# Patient Record
Sex: FEMALE | Race: BLACK OR AFRICAN AMERICAN | NOT HISPANIC OR LATINO | Employment: FULL TIME | URBAN - METROPOLITAN AREA
[De-identification: names, ages, dates, MRNs, and addresses within clinical notes are randomized per-mention and may not be internally consistent; named-entity substitution may affect disease eponyms.]

---

## 2023-03-14 ENCOUNTER — APPOINTMENT (OUTPATIENT)
Dept: PRIMARY CARE | Facility: CLINIC | Age: 43
End: 2023-03-14
Payer: COMMERCIAL

## 2023-04-04 RX ORDER — SERTRALINE HYDROCHLORIDE 25 MG/1
25 TABLET, FILM COATED ORAL DAILY
OUTPATIENT
Start: 2023-04-04

## 2023-04-04 RX ORDER — SERTRALINE HYDROCHLORIDE 25 MG/1
25 TABLET, FILM COATED ORAL DAILY
COMMUNITY
End: 2023-04-05 | Stop reason: SDUPTHER

## 2023-04-04 RX ORDER — TRIAMTERENE/HYDROCHLOROTHIAZID 37.5-25 MG
1 TABLET ORAL DAILY
OUTPATIENT
Start: 2023-04-04

## 2023-04-04 RX ORDER — TRIAMTERENE/HYDROCHLOROTHIAZID 37.5-25 MG
1 TABLET ORAL DAILY
COMMUNITY
End: 2023-04-05 | Stop reason: SDUPTHER

## 2023-04-05 DIAGNOSIS — I10 ESSENTIAL (PRIMARY) HYPERTENSION: Primary | ICD-10-CM

## 2023-04-05 DIAGNOSIS — F41.9 ANXIETY: ICD-10-CM

## 2023-04-05 RX ORDER — SERTRALINE HYDROCHLORIDE 25 MG/1
25 TABLET, FILM COATED ORAL DAILY
Qty: 30 TABLET | Refills: 0 | Status: SHIPPED | OUTPATIENT
Start: 2023-04-05 | End: 2023-04-26 | Stop reason: SDUPTHER

## 2023-04-05 RX ORDER — TRIAMTERENE/HYDROCHLOROTHIAZID 37.5-25 MG
1 TABLET ORAL DAILY
Qty: 30 TABLET | Refills: 0 | Status: SHIPPED | OUTPATIENT
Start: 2023-04-05 | End: 2023-04-26 | Stop reason: SDUPTHER

## 2023-04-26 DIAGNOSIS — I10 ESSENTIAL (PRIMARY) HYPERTENSION: ICD-10-CM

## 2023-04-26 DIAGNOSIS — F41.9 ANXIETY: ICD-10-CM

## 2023-04-26 RX ORDER — SERTRALINE HYDROCHLORIDE 25 MG/1
25 TABLET, FILM COATED ORAL DAILY
Qty: 30 TABLET | Refills: 0 | Status: SHIPPED | OUTPATIENT
Start: 2023-04-26 | End: 2023-05-26 | Stop reason: SDUPTHER

## 2023-04-26 RX ORDER — TRIAMTERENE/HYDROCHLOROTHIAZID 37.5-25 MG
1 TABLET ORAL DAILY
Qty: 30 TABLET | Refills: 0 | Status: SHIPPED | OUTPATIENT
Start: 2023-04-26 | End: 2023-05-01 | Stop reason: SDUPTHER

## 2023-05-01 ENCOUNTER — OFFICE VISIT (OUTPATIENT)
Dept: PRIMARY CARE | Facility: CLINIC | Age: 43
End: 2023-05-01
Payer: COMMERCIAL

## 2023-05-01 VITALS
HEIGHT: 68 IN | TEMPERATURE: 97.5 F | HEART RATE: 97 BPM | SYSTOLIC BLOOD PRESSURE: 136 MMHG | OXYGEN SATURATION: 98 % | WEIGHT: 293 LBS | BODY MASS INDEX: 44.41 KG/M2 | DIASTOLIC BLOOD PRESSURE: 86 MMHG

## 2023-05-01 DIAGNOSIS — I10 ESSENTIAL (PRIMARY) HYPERTENSION: Primary | ICD-10-CM

## 2023-05-01 DIAGNOSIS — F41.9 ANXIETY: ICD-10-CM

## 2023-05-01 PROCEDURE — 3075F SYST BP GE 130 - 139MM HG: CPT | Performed by: FAMILY MEDICINE

## 2023-05-01 PROCEDURE — 99214 OFFICE O/P EST MOD 30 MIN: CPT | Performed by: FAMILY MEDICINE

## 2023-05-01 PROCEDURE — 3079F DIAST BP 80-89 MM HG: CPT | Performed by: FAMILY MEDICINE

## 2023-05-01 PROCEDURE — 1036F TOBACCO NON-USER: CPT | Performed by: FAMILY MEDICINE

## 2023-05-01 RX ORDER — TRIAMTERENE/HYDROCHLOROTHIAZID 37.5-25 MG
1 TABLET ORAL DAILY
Qty: 90 TABLET | Refills: 0 | Status: SHIPPED | OUTPATIENT
Start: 2023-05-01 | End: 2023-08-24 | Stop reason: SDUPTHER

## 2023-05-01 ASSESSMENT — PATIENT HEALTH QUESTIONNAIRE - PHQ9
SUM OF ALL RESPONSES TO PHQ9 QUESTIONS 1 AND 2: 0
2. FEELING DOWN, DEPRESSED OR HOPELESS: NOT AT ALL
1. LITTLE INTEREST OR PLEASURE IN DOING THINGS: NOT AT ALL

## 2023-05-01 ASSESSMENT — PAIN SCALES - GENERAL: PAINLEVEL: 0-NO PAIN

## 2023-05-01 NOTE — PATIENT INSTRUCTIONS
BP with fair control on current medication.  No change today.  Continue current medicines.                                                                                                Blood Pressure Treatment goals include:                                                                                    BP of 120/80, with no higher than 140/85 on consistent basis.                               Exercise at 150 minutes weekly.  Eat a balanced diet, rich in fruits and vegetables, low in sodium and processed foods.  If you are overweight, work toward a goal BMI of less than 25.  Check fasting blood work some morning prior to eating.    Anxiety is controlled on sertraline 25 mg, but concern about whether it may be related to weight gain.  Can wean down on dose to 1/2 pill for 1-2 weeks, then 1/2 pill every other day for 1-2 weeks, then stop.  If anxiety starts to creep back in, will start duloxetine 20 mg, which does not have weight can associated with it.    Can monitor knee for know.  If continued or worsening, will refer to ortho.

## 2023-05-01 NOTE — PROGRESS NOTES
"Subjective   Patient ID: Nico Pedro is a 43 y.o. female who presents for Med Management (Pt. Presents for medication management).  Had procedure with Dr. Schreiber  Still having heavy periods, but not as bad with cramping.  May end up with JANAY.    Is at heaviest, and is working on weight loss.  Thinking of weaning off.  Anxiety is controlled.  Stress is not bad now.    Not checking BP.  No SE to medication.  No CP, SOB, palpitation, or swelling.  No HA or dizziness.    Noted right knee swelling and popping, no trauma.  Still some discomfort.  Doing RICE.          Review of Systems    Objective   /86 (BP Location: Right arm, Patient Position: Sitting, BP Cuff Size: Large adult)   Pulse 97   Temp 36.4 °C (97.5 °F) (Temporal)   Ht 1.727 m (5' 8\")   Wt 139 kg (307 lb 6.4 oz)   SpO2 98%   BMI 46.74 kg/m²     Physical Exam  Constitutional:       Appearance: Normal appearance.   Cardiovascular:      Rate and Rhythm: Normal rate and regular rhythm.      Heart sounds: No murmur heard.  Pulmonary:      Effort: Pulmonary effort is normal.      Breath sounds: Normal breath sounds.   Musculoskeletal:      Right lower leg: No edema.      Left lower leg: No edema.   Neurological:      Mental Status: She is alert.           Assessment/Plan   Problem List Items Addressed This Visit          Circulatory    Essential (primary) hypertension - Primary    Relevant Medications    triamterene-hydrochlorothiazid (Maxzide-25) 37.5-25 mg tablet    Other Relevant Orders    Basic Metabolic Panel    Lipid Panel       Other    Anxiety          "

## 2023-05-26 DIAGNOSIS — F41.9 ANXIETY: ICD-10-CM

## 2023-05-26 RX ORDER — SERTRALINE HYDROCHLORIDE 25 MG/1
25 TABLET, FILM COATED ORAL DAILY
Qty: 30 TABLET | Refills: 0 | Status: SHIPPED | OUTPATIENT
Start: 2023-05-26 | End: 2023-06-26 | Stop reason: SDUPTHER

## 2023-06-07 ENCOUNTER — LAB (OUTPATIENT)
Dept: LAB | Facility: LAB | Age: 43
End: 2023-06-07
Payer: COMMERCIAL

## 2023-06-07 DIAGNOSIS — I10 ESSENTIAL (PRIMARY) HYPERTENSION: ICD-10-CM

## 2023-06-07 LAB
ANION GAP IN SER/PLAS: 10 MMOL/L (ref 10–20)
CALCIUM (MG/DL) IN SER/PLAS: 9.3 MG/DL (ref 8.6–10.6)
CARBON DIOXIDE, TOTAL (MMOL/L) IN SER/PLAS: 29 MMOL/L (ref 21–32)
CHLORIDE (MMOL/L) IN SER/PLAS: 105 MMOL/L (ref 98–107)
CHOLESTEROL (MG/DL) IN SER/PLAS: 164 MG/DL (ref 0–199)
CHOLESTEROL IN HDL (MG/DL) IN SER/PLAS: 37.9 MG/DL
CHOLESTEROL/HDL RATIO: 4.3
CREATININE (MG/DL) IN SER/PLAS: 0.59 MG/DL (ref 0.5–1.05)
GFR FEMALE: >90 ML/MIN/1.73M2
GLUCOSE (MG/DL) IN SER/PLAS: 98 MG/DL (ref 74–99)
LDL: 112 MG/DL (ref 0–99)
POTASSIUM (MMOL/L) IN SER/PLAS: 4 MMOL/L (ref 3.5–5.3)
SODIUM (MMOL/L) IN SER/PLAS: 140 MMOL/L (ref 136–145)
TRIGLYCERIDE (MG/DL) IN SER/PLAS: 69 MG/DL (ref 0–149)
UREA NITROGEN (MG/DL) IN SER/PLAS: 10 MG/DL (ref 6–23)
VLDL: 14 MG/DL (ref 0–40)

## 2023-06-07 PROCEDURE — 36415 COLL VENOUS BLD VENIPUNCTURE: CPT

## 2023-06-07 PROCEDURE — 80048 BASIC METABOLIC PNL TOTAL CA: CPT

## 2023-06-07 PROCEDURE — 80061 LIPID PANEL: CPT

## 2023-06-26 DIAGNOSIS — F41.9 ANXIETY: ICD-10-CM

## 2023-06-26 RX ORDER — SERTRALINE HYDROCHLORIDE 25 MG/1
25 TABLET, FILM COATED ORAL DAILY
Qty: 30 TABLET | Refills: 0 | Status: SHIPPED | OUTPATIENT
Start: 2023-06-26 | End: 2023-07-25 | Stop reason: SDUPTHER

## 2023-07-25 DIAGNOSIS — F41.9 ANXIETY: ICD-10-CM

## 2023-07-25 RX ORDER — SERTRALINE HYDROCHLORIDE 25 MG/1
25 TABLET, FILM COATED ORAL DAILY
Qty: 90 TABLET | Refills: 0 | Status: SHIPPED | OUTPATIENT
Start: 2023-07-25 | End: 2023-10-23 | Stop reason: SDUPTHER

## 2023-08-24 DIAGNOSIS — I10 ESSENTIAL (PRIMARY) HYPERTENSION: ICD-10-CM

## 2023-08-24 RX ORDER — TRIAMTERENE/HYDROCHLOROTHIAZID 37.5-25 MG
1 TABLET ORAL DAILY
Qty: 90 TABLET | Refills: 0 | Status: SHIPPED | OUTPATIENT
Start: 2023-08-24 | End: 2023-11-22 | Stop reason: SDUPTHER

## 2023-09-13 ENCOUNTER — APPOINTMENT (OUTPATIENT)
Dept: PRIMARY CARE | Facility: CLINIC | Age: 43
End: 2023-09-13
Payer: COMMERCIAL

## 2023-10-23 DIAGNOSIS — F41.9 ANXIETY: ICD-10-CM

## 2023-10-23 RX ORDER — SERTRALINE HYDROCHLORIDE 25 MG/1
25 TABLET, FILM COATED ORAL DAILY
Qty: 90 TABLET | Refills: 0 | Status: SHIPPED | OUTPATIENT
Start: 2023-10-23 | End: 2024-01-23 | Stop reason: SDUPTHER

## 2023-10-25 ENCOUNTER — APPOINTMENT (OUTPATIENT)
Dept: PRIMARY CARE | Facility: CLINIC | Age: 43
End: 2023-10-25
Payer: COMMERCIAL

## 2023-11-22 DIAGNOSIS — I10 ESSENTIAL (PRIMARY) HYPERTENSION: ICD-10-CM

## 2023-11-23 RX ORDER — TRIAMTERENE/HYDROCHLOROTHIAZID 37.5-25 MG
1 TABLET ORAL DAILY
Qty: 90 TABLET | Refills: 0 | Status: SHIPPED | OUTPATIENT
Start: 2023-11-23 | End: 2024-02-20 | Stop reason: SDUPTHER

## 2023-12-12 ENCOUNTER — APPOINTMENT (OUTPATIENT)
Dept: PRIMARY CARE | Facility: CLINIC | Age: 43
End: 2023-12-12
Payer: COMMERCIAL

## 2024-01-23 DIAGNOSIS — F41.9 ANXIETY: ICD-10-CM

## 2024-01-23 RX ORDER — SERTRALINE HYDROCHLORIDE 25 MG/1
25 TABLET, FILM COATED ORAL DAILY
Qty: 90 TABLET | Refills: 0 | Status: SHIPPED | OUTPATIENT
Start: 2024-01-23 | End: 2024-04-22 | Stop reason: SDUPTHER

## 2024-02-20 DIAGNOSIS — I10 ESSENTIAL (PRIMARY) HYPERTENSION: ICD-10-CM

## 2024-02-20 RX ORDER — TRIAMTERENE/HYDROCHLOROTHIAZID 37.5-25 MG
1 TABLET ORAL DAILY
Qty: 90 TABLET | Refills: 0 | Status: SHIPPED | OUTPATIENT
Start: 2024-02-20 | End: 2024-05-19

## 2024-03-04 ENCOUNTER — OFFICE VISIT (OUTPATIENT)
Dept: PRIMARY CARE | Facility: CLINIC | Age: 44
End: 2024-03-04
Payer: COMMERCIAL

## 2024-03-04 VITALS
HEART RATE: 103 BPM | BODY MASS INDEX: 44.41 KG/M2 | SYSTOLIC BLOOD PRESSURE: 142 MMHG | HEIGHT: 68 IN | WEIGHT: 293 LBS | TEMPERATURE: 97.7 F | OXYGEN SATURATION: 97 % | DIASTOLIC BLOOD PRESSURE: 62 MMHG

## 2024-03-04 DIAGNOSIS — J45.40 MODERATE PERSISTENT ASTHMA WITHOUT COMPLICATION (HHS-HCC): ICD-10-CM

## 2024-03-04 DIAGNOSIS — F41.9 ANXIETY: ICD-10-CM

## 2024-03-04 DIAGNOSIS — I10 ESSENTIAL (PRIMARY) HYPERTENSION: ICD-10-CM

## 2024-03-04 DIAGNOSIS — Z12.31 BREAST CANCER SCREENING BY MAMMOGRAM: ICD-10-CM

## 2024-03-04 DIAGNOSIS — E66.01 MORBID OBESITY WITH BODY MASS INDEX (BMI) OF 45.0 TO 49.9 IN ADULT (MULTI): ICD-10-CM

## 2024-03-04 DIAGNOSIS — Z12.11 COLON CANCER SCREENING: ICD-10-CM

## 2024-03-04 DIAGNOSIS — G47.33 OSA (OBSTRUCTIVE SLEEP APNEA): ICD-10-CM

## 2024-03-04 DIAGNOSIS — Z00.00 ROUTINE GENERAL MEDICAL EXAMINATION AT A HEALTH CARE FACILITY: Primary | ICD-10-CM

## 2024-03-04 PROCEDURE — 1036F TOBACCO NON-USER: CPT | Performed by: FAMILY MEDICINE

## 2024-03-04 PROCEDURE — 99213 OFFICE O/P EST LOW 20 MIN: CPT | Performed by: FAMILY MEDICINE

## 2024-03-04 PROCEDURE — 3077F SYST BP >= 140 MM HG: CPT | Performed by: FAMILY MEDICINE

## 2024-03-04 PROCEDURE — 3078F DIAST BP <80 MM HG: CPT | Performed by: FAMILY MEDICINE

## 2024-03-04 PROCEDURE — 99396 PREV VISIT EST AGE 40-64: CPT | Performed by: FAMILY MEDICINE

## 2024-03-04 PROCEDURE — 3008F BODY MASS INDEX DOCD: CPT | Performed by: FAMILY MEDICINE

## 2024-03-04 RX ORDER — ALBUTEROL SULFATE 90 UG/1
2 AEROSOL, METERED RESPIRATORY (INHALATION) EVERY 4 HOURS PRN
COMMUNITY
Start: 2022-07-13

## 2024-03-04 ASSESSMENT — ENCOUNTER SYMPTOMS
OCCASIONAL FEELINGS OF UNSTEADINESS: 0
DEPRESSION: 0
LOSS OF SENSATION IN FEET: 0

## 2024-03-04 ASSESSMENT — PAIN SCALES - GENERAL: PAINLEVEL: 0-NO PAIN

## 2024-03-04 ASSESSMENT — PATIENT HEALTH QUESTIONNAIRE - PHQ9
2. FEELING DOWN, DEPRESSED OR HOPELESS: NOT AT ALL
SUM OF ALL RESPONSES TO PHQ9 QUESTIONS 1 AND 2: 0
1. LITTLE INTEREST OR PLEASURE IN DOING THINGS: NOT AT ALL

## 2024-03-04 NOTE — PROGRESS NOTES
"Subjective   Patient ID: Nico Pedro is a 44 y.o. female who presents for Annual Exam, Asthma, and Obesity.  Well Adult Physical   Patient here for a comprehensive physical exam.     History:  LMP: beginning feb 2024  Last pap date: 2020?  Abnormal pap? no  Para: 2    No colon cancer in family.    Tried CPAP, but wasn't tolerating,  so sent it back.  Knows she needs to get back on it, so needs new order for it.    Is  at  highest weight.  Not losing anything.  Exercises, tries to eat well.  Had tried nutritionist and  weight watches.    Has been out of Qvar and is wheezing more.    Had been anemic  in the past, when periods were heavier.   Wonders if  she should be taking iron still.    Anxiety is stable on 25 mg sertraline.          Review of Systems   All other systems reviewed and are negative.      Objective   /62 (BP Location: Right arm, Patient Position: Sitting, BP Cuff Size: Adult)   Pulse 103   Temp 36.5 °C (97.7 °F) (Oral)   Ht 1.727 m (5' 8\")   Wt 145 kg (320 lb 3.2 oz)   SpO2 97%   BMI 48.69 kg/m²     Physical Exam  Vitals reviewed.   Constitutional:       General: She is not in acute distress.     Appearance: Normal appearance. She is obese.   HENT:      Head: Normocephalic and atraumatic.   Eyes:      Pupils: Pupils are equal, round, and reactive to light.   Cardiovascular:      Rate and Rhythm: Normal rate and regular rhythm.      Heart sounds: Normal heart sounds. No murmur heard.  Pulmonary:      Effort: Pulmonary effort is normal.      Breath sounds: Normal breath sounds.   Abdominal:      General: Bowel sounds are normal.      Palpations: Abdomen is soft. There is no mass.      Tenderness: There is no abdominal tenderness.   Musculoskeletal:      Cervical back: No rigidity.   Lymphadenopathy:      Cervical: No cervical adenopathy.   Skin:     General: Skin is warm and dry.   Neurological:      Mental Status: She is alert. Mental status is at baseline.      Gait: Gait normal. "   Psychiatric:         Mood and Affect: Mood normal.         Behavior: Behavior normal.         Thought Content: Thought content normal.         Judgment: Judgment normal.           Assessment/Plan   Problem List Items Addressed This Visit       Anxiety    Essential (primary) hypertension    Relevant Orders    Follow Up In [facility]    Moderate persistent asthma without complication    Relevant Medications    beclomethasone dipropionate (Qvar) 80 mcg/actuation inhaler     Other Visit Diagnoses       Routine general medical examination at a health care facility    -  Primary    Relevant Orders    Lipid Panel    Hemoglobin A1C    Comprehensive Metabolic Panel    CBC    Iron and TIBC    Breast cancer screening by mammogram        Relevant Orders    BI mammo bilateral screening tomosynthesis    Colon cancer screening        Relevant Orders    Cologuard® colon cancer screening    CISCO (obstructive sleep apnea)        Relevant Orders    Positive Airway Pressure (PAP) Therapy    Follow Up In [facility] Pharm[facility]    Morbid obesity with body mass index (BMI) of 45.0 to 49.9 in adult (CMS/[facility])        Relevant Orders    Follow Up In [facility] Pharmacy

## 2024-03-04 NOTE — PATIENT INSTRUCTIONS
Immunizations recommended:  --Flu shot every fall. -done  --Shingrix is the shingles vaccine, and can be done after the age of 50.  --Tetanus every 10 years. Yours was done in 2018.  --Covid vaccine.    If your pap smears have been normal, you can do them every 3-5 years, and stop after the age of 65.  Yours was done in 2020, and should be done next year.    Colonoscopy should be done every 10 years after the age of 45, unless there was a previous abnormality, or family history of colon cancer.  Alternatives would be Cologuard every 3 years (looks for genetic evidence of colon cancer in stool), or stool FIT stool test yearly (looks for microscopic blood in stool)  --cologuard ordered.    Mammogram screening recommendations are changing, but at this time, I recommend a mammogram every 1-2 years in your 40's, and yearly after the age of 50.  Having a family history of breast cancer may change that.  --ordered    You should try to get 150 minutes of exercise weekly.  Be sure to eat a diet rich in fruits and vegetables, and low in saturated fats and sodium.  Strive for a healthy BMI of less than 25.     Make sure to wear sun screen when outside, and check for changing or unusual moles.    Pre-menopause recommendations are for 1000 mg calcium and 1000 units vitamin D3 daily.  After menopause calcium recommendation is 1200 mg, with 1000 units of vitamin D3    I recommend you get a Living Will and Durable Power of  for Healthcare. These documents state what care you would want if you couldn't speak for yourself. They help your loved ones in caring for you if that happens.   Once you have those forms completed, you can keep a copy on file with your doctor.    For medication help with weight  loss,  refer to  pharmacy.  Continue to work on diet and exercise.    For asthma, restart Qvar 80 mcg, 2 puffs twice a day.  If continue wheezing, or using albuterol more than 3 times  a week,  will adjust  medication.    BP  borderline.  Continue triamterene hydrochlorothiazide   Work on weight loss, which can help improve BP.  Follow up in 1-3 months.  If BP still borderline  or high, will add another medication.    For CISCO, Auto-PAP ordered.    Check fasting blood work some morning.  Fast for 12 hours prior to having blood drawn.  You should drink plenty of water, and can have black tea or black coffee, if you'd like.    For anxiety,  continue low dose sertraline 25 mg.

## 2024-03-07 ENCOUNTER — TELEPHONE (OUTPATIENT)
Dept: PRIMARY CARE | Facility: CLINIC | Age: 44
End: 2024-03-07
Payer: COMMERCIAL

## 2024-03-07 DIAGNOSIS — G47.33 OSA (OBSTRUCTIVE SLEEP APNEA): Primary | ICD-10-CM

## 2024-03-07 NOTE — TELEPHONE ENCOUNTER
Medical Service company called received order for cpap but can not go through with request, says that patient needs to have an new sleep study old one has .

## 2024-03-13 ENCOUNTER — APPOINTMENT (OUTPATIENT)
Dept: RADIOLOGY | Facility: CLINIC | Age: 44
End: 2024-03-13
Payer: COMMERCIAL

## 2024-03-18 ENCOUNTER — APPOINTMENT (OUTPATIENT)
Dept: PHARMACY | Facility: HOSPITAL | Age: 44
End: 2024-03-18
Payer: COMMERCIAL

## 2024-04-09 ENCOUNTER — TELEMEDICINE (OUTPATIENT)
Dept: PHARMACY | Facility: HOSPITAL | Age: 44
End: 2024-04-09
Payer: COMMERCIAL

## 2024-04-09 DIAGNOSIS — I10 ESSENTIAL (PRIMARY) HYPERTENSION: ICD-10-CM

## 2024-04-09 DIAGNOSIS — G47.33 OSA (OBSTRUCTIVE SLEEP APNEA): ICD-10-CM

## 2024-04-09 DIAGNOSIS — E66.01 MORBID OBESITY WITH BODY MASS INDEX (BMI) OF 45.0 TO 49.9 IN ADULT (MULTI): ICD-10-CM

## 2024-04-09 NOTE — PROGRESS NOTES
Patient ID: Nico Pedro is a 44 y.o. female who presents for Obesity.    Referring Provider: Charlotte Marcos MD  PCP: Charlotte Marcos MD   Last visit with PCP: 3/4/24   Next visit with PCP: None scheduled      Subjective   HPI  PMH includes hypertension, asthma, and obstructive sleep apnea. She exercises regularly and uses [employer]. She has had a visit with a nutritionist before.    Denies personal and family history of medullary thyroid cancer. Denies history of pancreatitis.    Review of Systems      Objective     There were no vitals taken for this visit.   BP Readings from Last 4 Encounters:   03/04/24 142/62   05/01/23 136/86   11/14/22 124/82   06/07/22 132/82      There were no vitals filed for this visit.     Labs  Lab Results   Component Value Date    BILITOT 0.2 03/07/2022    CALCIUM 9.3 06/07/2023    CO2 29 06/07/2023     06/07/2023    CREATININE 0.59 06/07/2023    GLUCOSE 98 06/07/2023    ALKPHOS 62 03/07/2022    K 4.0 06/07/2023    PROT 7.4 03/07/2022     06/07/2023    AST 19 03/07/2022    ALT 19 03/07/2022    BUN 10 06/07/2023    ANIONGAP 10 06/07/2023    PHOS 2.9 03/08/2022    ALBUMIN 3.8 03/08/2022    GFRF >90 06/07/2023     Lab Results   Component Value Date    TRIG 69 06/07/2023    CHOL 164 06/07/2023    HDL 37.9 (A) 06/07/2023     Lab Results   Component Value Date    HGBA1C 5.0 03/07/2022       Current Outpatient Medications   Medication Instructions    albuterol 90 mcg/actuation inhaler 2 puffs, inhalation, Every 4 hours PRN    beclomethasone dipropionate (Qvar) 80 mcg/actuation inhaler 2 Inhalations, inhalation, 2 times daily RT, Rinse mouth with water after use to reduce aftertaste and incidence of candidiasis. Do not swallow.    sertraline (ZOLOFT) 25 mg, oral, Daily, as directed    triamterene-hydrochlorothiazid (Maxzide-25) 37.5-25 mg tablet 1 tablet, oral, Daily         Drug Interactions;  None at time of review    Assessment/Plan   Problem List Items  Addressed This Visit             ICD-10-CM    Essential (primary) hypertension I10     Other Visit Diagnoses         Codes    CISCO (obstructive sleep apnea)     G47.33    Morbid obesity with body mass index (BMI) of 45.0 to 49.9 in adult (CMS/[facility])     E66.01, Z68.42        Unfortunately, current prescription plan does not cover GLP-1 receptor agonists for weight loss and those for diabetes require a type 2 diabetes diagnosis. Paying out of pocket is not feasible, but patient reports her  will have a new job and new plan in the near future. Patient has my contact info if she would like to check coverage.   -Reviewed MOA, potential side effects, and storage of Zepbound/Wegovy.    Follow-up: Patient will call back when new insurance is active     Time spent with pt: Total length of time 10 (minutes) of the encounter and more than 50% was spent counseling the patient.    Bailee Reich, PharmD    Continue all meds under the continuation of care with the referring provider and clinical pharmacy team.    Verbal consent to manage patient's drug therapy was obtained from the patient. They were informed they may decline to participate or withdraw from participation in pharmacy services at any time.

## 2024-04-12 ENCOUNTER — HOSPITAL ENCOUNTER (OUTPATIENT)
Dept: RADIOLOGY | Facility: CLINIC | Age: 44
Discharge: HOME | End: 2024-04-12
Payer: COMMERCIAL

## 2024-04-12 VITALS — BODY MASS INDEX: 44.41 KG/M2 | HEIGHT: 68 IN | WEIGHT: 293 LBS

## 2024-04-12 DIAGNOSIS — Z12.31 BREAST CANCER SCREENING BY MAMMOGRAM: ICD-10-CM

## 2024-04-12 PROCEDURE — 77063 BREAST TOMOSYNTHESIS BI: CPT | Performed by: RADIOLOGY

## 2024-04-12 PROCEDURE — 77067 SCR MAMMO BI INCL CAD: CPT

## 2024-04-12 PROCEDURE — 77067 SCR MAMMO BI INCL CAD: CPT | Performed by: RADIOLOGY

## 2024-04-17 LAB — NONINV COLON CA DNA+OCC BLD SCRN STL QL: NEGATIVE

## 2024-04-22 DIAGNOSIS — F41.9 ANXIETY: ICD-10-CM

## 2024-04-22 RX ORDER — SERTRALINE HYDROCHLORIDE 25 MG/1
25 TABLET, FILM COATED ORAL DAILY
Qty: 90 TABLET | Refills: 0 | Status: SHIPPED | OUTPATIENT
Start: 2024-04-22 | End: 2024-05-19

## 2024-05-03 ENCOUNTER — APPOINTMENT (OUTPATIENT)
Dept: OBSTETRICS AND GYNECOLOGY | Facility: CLINIC | Age: 44
End: 2024-05-03
Payer: COMMERCIAL

## 2024-05-17 ENCOUNTER — APPOINTMENT (OUTPATIENT)
Dept: OBSTETRICS AND GYNECOLOGY | Facility: CLINIC | Age: 44
End: 2024-05-17
Payer: COMMERCIAL

## 2024-05-19 DIAGNOSIS — I10 ESSENTIAL (PRIMARY) HYPERTENSION: ICD-10-CM

## 2024-05-19 DIAGNOSIS — F41.9 ANXIETY: ICD-10-CM

## 2024-05-19 RX ORDER — SERTRALINE HYDROCHLORIDE 25 MG/1
25 TABLET, FILM COATED ORAL DAILY
Qty: 90 TABLET | Refills: 1 | Status: SHIPPED | OUTPATIENT
Start: 2024-05-19

## 2024-05-19 RX ORDER — TRIAMTERENE/HYDROCHLOROTHIAZID 37.5-25 MG
1 TABLET ORAL DAILY
Qty: 90 TABLET | Refills: 1 | Status: SHIPPED | OUTPATIENT
Start: 2024-05-19 | End: 2024-05-29 | Stop reason: SDUPTHER

## 2024-05-29 DIAGNOSIS — I10 ESSENTIAL (PRIMARY) HYPERTENSION: ICD-10-CM

## 2024-05-29 RX ORDER — TRIAMTERENE/HYDROCHLOROTHIAZID 37.5-25 MG
1 TABLET ORAL DAILY
Qty: 90 TABLET | Refills: 1 | Status: SHIPPED | OUTPATIENT
Start: 2024-05-29

## 2024-05-30 ENCOUNTER — TELEPHONE (OUTPATIENT)
Dept: SLEEP MEDICINE | Facility: HOSPITAL | Age: 44
End: 2024-05-30

## 2024-05-30 NOTE — TELEPHONE ENCOUNTER
Dear Charlotte Marcos MD,    Thank you for referring this patient for an HSAT. This patient's study has been denied by insurance. If you would like to proceed with an in lab sleep study, please place an order accordingly. If you would like to proceed with an HSAT please call [phone] for peer to peer within 14 days.    Please let us know if you have questions.     Regards,   The  Sleep Medicine Team

## 2024-08-10 ENCOUNTER — LAB (OUTPATIENT)
Dept: LAB | Facility: LAB | Age: 44
End: 2024-08-10
Payer: COMMERCIAL

## 2024-08-10 DIAGNOSIS — Z00.00 ROUTINE GENERAL MEDICAL EXAMINATION AT A HEALTH CARE FACILITY: ICD-10-CM

## 2024-08-10 LAB
ERYTHROCYTE [DISTWIDTH] IN BLOOD BY AUTOMATED COUNT: 16 % (ref 11.5–14.5)
HCT VFR BLD AUTO: 38.1 % (ref 36–46)
HGB BLD-MCNC: 11.3 G/DL (ref 12–16)
MCH RBC QN AUTO: 25.6 PG (ref 26–34)
MCHC RBC AUTO-ENTMCNC: 29.7 G/DL (ref 32–36)
MCV RBC AUTO: 86 FL (ref 80–100)
NRBC BLD-RTO: 0 /100 WBCS (ref 0–0)
PLATELET # BLD AUTO: 355 X10*3/UL (ref 150–450)
RBC # BLD AUTO: 4.42 X10*6/UL (ref 4–5.2)
WBC # BLD AUTO: 5.1 X10*3/UL (ref 4.4–11.3)

## 2024-08-10 PROCEDURE — 36415 COLL VENOUS BLD VENIPUNCTURE: CPT

## 2024-08-11 LAB
ALBUMIN SERPL BCP-MCNC: 4 G/DL (ref 3.4–5)
ALP SERPL-CCNC: 67 U/L (ref 33–110)
ALT SERPL W P-5'-P-CCNC: 24 U/L (ref 7–45)
ANION GAP SERPL CALC-SCNC: 14 MMOL/L (ref 10–20)
AST SERPL W P-5'-P-CCNC: 19 U/L (ref 9–39)
BILIRUB SERPL-MCNC: 0.4 MG/DL (ref 0–1.2)
BUN SERPL-MCNC: 9 MG/DL (ref 6–23)
CALCIUM SERPL-MCNC: 9 MG/DL (ref 8.6–10.6)
CHLORIDE SERPL-SCNC: 102 MMOL/L (ref 98–107)
CHOLEST SERPL-MCNC: 168 MG/DL (ref 0–199)
CHOLESTEROL/HDL RATIO: 4.3
CO2 SERPL-SCNC: 26 MMOL/L (ref 21–32)
CREAT SERPL-MCNC: 0.62 MG/DL (ref 0.5–1.05)
EGFRCR SERPLBLD CKD-EPI 2021: >90 ML/MIN/1.73M*2
EST. AVERAGE GLUCOSE BLD GHB EST-MCNC: 97 MG/DL
GLUCOSE SERPL-MCNC: 97 MG/DL (ref 74–99)
HBA1C MFR BLD: 5 %
HDLC SERPL-MCNC: 39.3 MG/DL
IRON SATN MFR SERPL: 15 % (ref 25–45)
IRON SERPL-MCNC: 67 UG/DL (ref 35–150)
LDLC SERPL CALC-MCNC: 108 MG/DL
NON HDL CHOLESTEROL: 129 MG/DL (ref 0–149)
POTASSIUM SERPL-SCNC: 3.9 MMOL/L (ref 3.5–5.3)
PROT SERPL-MCNC: 6.8 G/DL (ref 6.4–8.2)
SODIUM SERPL-SCNC: 138 MMOL/L (ref 136–145)
TIBC SERPL-MCNC: 440 UG/DL (ref 240–445)
TRIGL SERPL-MCNC: 104 MG/DL (ref 0–149)
UIBC SERPL-MCNC: 373 UG/DL (ref 110–370)
VLDL: 21 MG/DL (ref 0–40)

## 2024-09-20 ENCOUNTER — TELEPHONE (OUTPATIENT)
Dept: PRIMARY CARE | Facility: CLINIC | Age: 44
End: 2024-09-20
Payer: COMMERCIAL

## 2024-09-26 ENCOUNTER — TELEPHONE (OUTPATIENT)
Dept: PHARMACY | Facility: HOSPITAL | Age: 44
End: 2024-09-26
Payer: COMMERCIAL

## 2024-09-26 NOTE — TELEPHONE ENCOUNTER
Unfortunately, patient's new insurance does not cover weight loss medications. She is unable to pay out of pocket.     Referred her to sample coverage letter on the [employer] website to attempt to request coverage from her insurance.    Thank you,  Bailee Reich, PharmD

## 2024-10-20 DIAGNOSIS — E66.01 MORBID OBESITY WITH BODY MASS INDEX (BMI) OF 45.0 TO 49.9 IN ADULT (MULTI): Primary | ICD-10-CM

## 2024-10-20 RX ORDER — SEMAGLUTIDE 0.25 MG/.5ML
0.25 INJECTION, SOLUTION SUBCUTANEOUS WEEKLY
Qty: 2 ML | Refills: 0 | Status: SHIPPED | OUTPATIENT
Start: 2024-10-20 | End: 2024-11-11

## 2024-11-19 DIAGNOSIS — E66.01 MORBID OBESITY WITH BODY MASS INDEX (BMI) OF 45.0 TO 49.9 IN ADULT (MULTI): Primary | ICD-10-CM

## 2024-12-26 DIAGNOSIS — J45.40 MODERATE PERSISTENT ASTHMA WITHOUT COMPLICATION (HHS-HCC): Primary | ICD-10-CM

## 2024-12-26 RX ORDER — ALBUTEROL SULFATE 90 UG/1
2 INHALANT RESPIRATORY (INHALATION) EVERY 4 HOURS PRN
Qty: 18 G | Refills: 0 | Status: SHIPPED | OUTPATIENT
Start: 2024-12-26

## 2024-12-27 ENCOUNTER — OFFICE VISIT (OUTPATIENT)
Dept: OBSTETRICS AND GYNECOLOGY | Facility: CLINIC | Age: 44
End: 2024-12-27
Payer: COMMERCIAL

## 2024-12-27 VITALS
SYSTOLIC BLOOD PRESSURE: 157 MMHG | WEIGHT: 293 LBS | DIASTOLIC BLOOD PRESSURE: 81 MMHG | BODY MASS INDEX: 44.41 KG/M2 | HEIGHT: 68 IN

## 2024-12-27 DIAGNOSIS — Z01.419 WOMEN'S ANNUAL ROUTINE GYNECOLOGICAL EXAMINATION: Primary | ICD-10-CM

## 2024-12-27 DIAGNOSIS — N89.8 MASS OF VAGINA: ICD-10-CM

## 2024-12-27 DIAGNOSIS — Z01.419 PAP TEST, AS PART OF ROUTINE GYNECOLOGICAL EXAMINATION: ICD-10-CM

## 2024-12-27 PROCEDURE — 99396 PREV VISIT EST AGE 40-64: CPT | Performed by: OBSTETRICS & GYNECOLOGY

## 2024-12-27 PROCEDURE — 3079F DIAST BP 80-89 MM HG: CPT | Performed by: OBSTETRICS & GYNECOLOGY

## 2024-12-27 PROCEDURE — 99386 PREV VISIT NEW AGE 40-64: CPT | Performed by: OBSTETRICS & GYNECOLOGY

## 2024-12-27 PROCEDURE — 3077F SYST BP >= 140 MM HG: CPT | Performed by: OBSTETRICS & GYNECOLOGY

## 2024-12-27 PROCEDURE — 1036F TOBACCO NON-USER: CPT | Performed by: OBSTETRICS & GYNECOLOGY

## 2024-12-27 PROCEDURE — 3008F BODY MASS INDEX DOCD: CPT | Performed by: OBSTETRICS & GYNECOLOGY

## 2024-12-27 RX ORDER — ALBUTEROL SULFATE 5 MG/ML
0.5 SOLUTION RESPIRATORY (INHALATION) EVERY 6 HOURS PRN
COMMUNITY

## 2024-12-27 RX ORDER — IBUPROFEN 800 MG/1
TABLET ORAL
COMMUNITY

## 2024-12-27 RX ORDER — NORETHINDRONE 5 MG/1
1 TABLET ORAL DAILY
COMMUNITY
Start: 2022-03-09

## 2024-12-27 SDOH — ECONOMIC STABILITY: TRANSPORTATION INSECURITY
IN THE PAST 12 MONTHS, HAS LACK OF TRANSPORTATION KEPT YOU FROM MEETINGS, WORK, OR FROM GETTING THINGS NEEDED FOR DAILY LIVING?: NO

## 2024-12-27 SDOH — ECONOMIC STABILITY: FOOD INSECURITY: WITHIN THE PAST 12 MONTHS, YOU WORRIED THAT YOUR FOOD WOULD RUN OUT BEFORE YOU GOT MONEY TO BUY MORE.: NEVER TRUE

## 2024-12-27 SDOH — ECONOMIC STABILITY: TRANSPORTATION INSECURITY
IN THE PAST 12 MONTHS, HAS THE LACK OF TRANSPORTATION KEPT YOU FROM MEDICAL APPOINTMENTS OR FROM GETTING MEDICATIONS?: NO

## 2024-12-27 SDOH — ECONOMIC STABILITY: FOOD INSECURITY: WITHIN THE PAST 12 MONTHS, THE FOOD YOU BOUGHT JUST DIDN'T LAST AND YOU DIDN'T HAVE MONEY TO GET MORE.: NEVER TRUE

## 2024-12-27 SDOH — HEALTH STABILITY: PHYSICAL HEALTH: ON AVERAGE, HOW MANY MINUTES DO YOU ENGAGE IN EXERCISE AT THIS LEVEL?: 20 MIN

## 2024-12-27 SDOH — HEALTH STABILITY: PHYSICAL HEALTH: ON AVERAGE, HOW MANY DAYS PER WEEK DO YOU ENGAGE IN MODERATE TO STRENUOUS EXERCISE (LIKE A BRISK WALK)?: 2 DAYS

## 2024-12-27 ASSESSMENT — LIFESTYLE VARIABLES
HOW MANY STANDARD DRINKS CONTAINING ALCOHOL DO YOU HAVE ON A TYPICAL DAY: PATIENT DOES NOT DRINK
SKIP TO QUESTIONS 9-10: 1
HOW OFTEN DO YOU HAVE A DRINK CONTAINING ALCOHOL: NEVER
AUDIT-C TOTAL SCORE: 0
HOW OFTEN DO YOU HAVE SIX OR MORE DRINKS ON ONE OCCASION: NEVER

## 2024-12-27 ASSESSMENT — ENCOUNTER SYMPTOMS
PSYCHIATRIC NEGATIVE: 0
CARDIOVASCULAR NEGATIVE: 0
HEMATOLOGIC/LYMPHATIC NEGATIVE: 0
LOSS OF SENSATION IN FEET: 0
OCCASIONAL FEELINGS OF UNSTEADINESS: 0
MUSCULOSKELETAL NEGATIVE: 0
GASTROINTESTINAL NEGATIVE: 0
EYES NEGATIVE: 0
DEPRESSION: 0
RESPIRATORY NEGATIVE: 0
NEUROLOGICAL NEGATIVE: 0
CONSTITUTIONAL NEGATIVE: 0
ALLERGIC/IMMUNOLOGIC NEGATIVE: 0
ENDOCRINE NEGATIVE: 0

## 2024-12-27 ASSESSMENT — SOCIAL DETERMINANTS OF HEALTH (SDOH)
WITHIN THE LAST YEAR, HAVE TO BEEN RAPED OR FORCED TO HAVE ANY KIND OF SEXUAL ACTIVITY BY YOUR PARTNER OR EX-PARTNER?: NO
WITHIN THE LAST YEAR, HAVE YOU BEEN KICKED, HIT, SLAPPED, OR OTHERWISE PHYSICALLY HURT BY YOUR PARTNER OR EX-PARTNER?: NO
WITHIN THE LAST YEAR, HAVE YOU BEEN AFRAID OF YOUR PARTNER OR EX-PARTNER?: NO
HOW HARD IS IT FOR YOU TO PAY FOR THE VERY BASICS LIKE FOOD, HOUSING, MEDICAL CARE, AND HEATING?: NOT HARD AT ALL
WITHIN THE LAST YEAR, HAVE YOU BEEN HUMILIATED OR EMOTIONALLY ABUSED IN OTHER WAYS BY YOUR PARTNER OR EX-PARTNER?: NO
IN THE PAST 12 MONTHS, HAS THE ELECTRIC, GAS, OIL, OR WATER COMPANY THREATENED TO SHUT OFF SERVICE IN YOUR HOME?: NO

## 2024-12-27 ASSESSMENT — PAIN SCALES - GENERAL: PAINLEVEL_OUTOF10: 0-NO PAIN

## 2024-12-27 NOTE — PROGRESS NOTES
"Nico Pedro is a 44 y.o. female who is here for a routine exam. PCP = Charlotte Marcos MD  Patient for annual exam no complaints.  Sexually active with steady partner.  Status post tubal ligation for birth control.  Patient's her cycles do tend to last long.  Up to 10 days including some spotting.  Has been this way for many years.  No associated problems    Review of Systems   Genitourinary:  Positive for menstrual problem.   All other systems reviewed and are negative.    Physical Exam  Constitutional:       Appearance: Normal appearance. She is obese.   Genitourinary:      Genitourinary Comments: External genitalia unremarkable  Vagina shows a probable vaginal wall cyst although it appears to be more midline than lateral at this point.  Doubt urethrocele.  Benign in appearance  Cervix closed uterus normal anteverted  Adnexa masses or tenderness  Perineum without lesions or swelling    Breast without masses tenderness or nipple discharge, normal appearance   Breasts:     Breasts are soft.     Right: Normal.      Left: Normal.   HENT:      Head: Normocephalic.      Nose: Nose normal.   Eyes:      Pupils: Pupils are equal, round, and reactive to light.   Cardiovascular:      Rate and Rhythm: Normal rate and regular rhythm.   Pulmonary:      Effort: Pulmonary effort is normal.      Breath sounds: Normal breath sounds.   Abdominal:      General: Abdomen is flat. Bowel sounds are normal.      Palpations: Abdomen is soft.   Musculoskeletal:         General: Normal range of motion.      Cervical back: Normal range of motion and neck supple.   Neurological:      General: No focal deficit present.      Mental Status: She is alert.   Skin:     General: Skin is warm and dry.   Psychiatric:         Mood and Affect: Mood normal.         Behavior: Behavior normal.         Thought Content: Thought content normal.         Judgment: Judgment normal.   Vitals reviewed.     Objective   /81   Ht 1.727 m (5' 8\")   Wt 141 " kg (310 lb)   LMP 11/15/2024   BMI 47.14 kg/m²   OB History          2    Para   2    Term   2            AB        Living             SAB        IAB        Ectopic        Multiple        Live Births                      GynHx:  Menarche/Menopause: 9  Periods are regular every 28-30 days, lasting 10 days.  Dysmenorrhea: none.   Cyclic symptoms include none.    Social History     Substance and Sexual Activity   Sexual Activity Not on file     Current contraception: Tubal Sterilization  STIs: none    Substance:   Tobacco Use: Low Risk  (3/4/2024)    Patient History     Smoking Tobacco Use: Never     Smokeless Tobacco Use: Never     Passive Exposure: Never      Social History     Substance and Sexual Activity   Drug Use Never      Social History     Substance and Sexual Activity   Alcohol Use Not Currently     Abuse: No  Depression Screen:   Negative    Past med hx and past surg hx reviewed and notable for: Tubal ligation    Assessment/Plan    Annual GYN exam is clinically positive for vaginal wall mass or cyst.  This appeared to extend towards the midline.  Probably not a urethrocele.  Does appear to be benign.  Refer to GYN urology for second opinion and possible excision.  Pap smear obtained.  Patient is sexually active with steady partner declining STD testing.  Patient has long cycles lasting up to 10 days including several days of spotting.  Has been this way for many years.  They are regular.  Discussed hormonal control of menses.  Patient at this point is all right with the way the situation is and does not wish any medication.  Discussed colonoscopy or Cologuard considering.  Will have mammogram ordered by primary care physician  Patient will return to office in 1 year or as needed

## 2025-01-06 ENCOUNTER — APPOINTMENT (OUTPATIENT)
Dept: OBSTETRICS AND GYNECOLOGY | Facility: CLINIC | Age: 45
End: 2025-01-06
Payer: COMMERCIAL

## 2025-01-20 ENCOUNTER — APPOINTMENT (OUTPATIENT)
Dept: OBSTETRICS AND GYNECOLOGY | Facility: CLINIC | Age: 45
End: 2025-01-20
Payer: COMMERCIAL

## 2025-02-04 ENCOUNTER — OFFICE VISIT (OUTPATIENT)
Dept: OBSTETRICS AND GYNECOLOGY | Facility: CLINIC | Age: 45
End: 2025-02-04
Payer: COMMERCIAL

## 2025-02-04 VITALS
HEART RATE: 111 BPM | SYSTOLIC BLOOD PRESSURE: 175 MMHG | WEIGHT: 293 LBS | HEIGHT: 68 IN | BODY MASS INDEX: 44.41 KG/M2 | DIASTOLIC BLOOD PRESSURE: 76 MMHG

## 2025-02-04 DIAGNOSIS — N89.8 MASS OF VAGINA: ICD-10-CM

## 2025-02-04 DIAGNOSIS — N36.8 URETHRAL CYST: Primary | ICD-10-CM

## 2025-02-04 PROCEDURE — 99213 OFFICE O/P EST LOW 20 MIN: CPT | Performed by: NURSE PRACTITIONER

## 2025-02-04 PROCEDURE — 3078F DIAST BP <80 MM HG: CPT | Performed by: NURSE PRACTITIONER

## 2025-02-04 PROCEDURE — 3077F SYST BP >= 140 MM HG: CPT | Performed by: NURSE PRACTITIONER

## 2025-02-04 PROCEDURE — 3008F BODY MASS INDEX DOCD: CPT | Performed by: NURSE PRACTITIONER

## 2025-02-04 ASSESSMENT — ENCOUNTER SYMPTOMS
RESPIRATORY NEGATIVE: 1
GASTROINTESTINAL NEGATIVE: 1
ENDOCRINE NEGATIVE: 1
EYES NEGATIVE: 1
MUSCULOSKELETAL NEGATIVE: 1
PSYCHIATRIC NEGATIVE: 1
CONSTITUTIONAL NEGATIVE: 1
NEUROLOGICAL NEGATIVE: 1
CARDIOVASCULAR NEGATIVE: 1

## 2025-02-04 ASSESSMENT — PAIN SCALES - GENERAL: PAINLEVEL_OUTOF10: 0-NO PAIN

## 2025-02-04 NOTE — PROGRESS NOTES
Referring Physician: Chepe Schreiber MD     Obstetric/Gynecologic History:  Nico Pedro has a history of : 2. Para: 2. There is a history of No history of  section(s).     Review of Systems   Constitutional: Negative.    HENT: Negative.     Eyes: Negative.    Respiratory: Negative.     Cardiovascular: Negative.    Gastrointestinal: Negative.    Endocrine: Negative.    Genitourinary:         Occasional urinary dribbling, no dysuria   Musculoskeletal: Negative.    Neurological: Negative.    Psychiatric/Behavioral: Negative.          HPI    Vaginal Symptoms:  - She has a vaginal cyst that has been present for awhile and was noted to have increased in size by Dr. Schreiber.  - She is seeking a second opinion to determine if surgical intervention is necessary.  - The cyst is described as being more outwardly prominent than before.  - She denies any bleeding from the cyst.    Urinary Symptoms:  - She experiences occasional urinary dribbling, particularly with coughing or sneezing.    Bowel Symptoms:  - She has not had a bowel movement today.    OBGYN History and Sexual Activity:  - She has had 2 pregnancies and 2 vaginal deliveries.  - She is still menstruating.  - Her  has noted the presence of the cyst during sexual activity, but she doesn't experience any dyspareunia.      Physical Exam  Constitutional:       Appearance: Normal appearance.   Genitourinary:      Genitourinary Comments: Soft, reducible non-painful cyst approximately 2 cm in size, located along the backside of the urethra. Bowels feel very full.   HENT:      Head: Normocephalic and atraumatic.   Neurological:      General: No focal deficit present.      Mental Status: She is oriented to person, place, and time. Mental status is at baseline.   Psychiatric:         Mood and Affect: Mood normal.         Behavior: Behavior normal.         Thought Content: Thought content normal.         Judgment: Judgment normal.   Vitals reviewed.           Assessment and Plan  45-year-old female being assessed for vaginal cyst.    Diagnoses:  #1 Vaginal cyst  #2 Urinary incontinence    Plan:  Vaginal cyst  - Upon exam, soft reducible non-painful cyst approximately 2 cm in size, located along the backside of the urethra.  - Suspected to be urethral diverticulum.  - Ordered pelvic MRI to determine if the cyst is continuous with the urethra. This will help in planning further management, potentially involving surgical excision if indicated.  - Discussed potential for the cyst to grow larger and affect sexual activity or urination, and the importance of addressing it before it becomes painful.    2. Urinary incontinence  - Occasional urinary dribbling, likely VASILE due to previous vaginal deliveries and possible urethral diverticulum.  - Will reassess urinary sxs after MRI results and potential tx of the urethral diverticulum.   - Will discuss further management in the future if urinary incontinence remains bothersome.    Follow-up after the MRI to discuss results and further evaluation..    Scribe Attestation  By signing my name below, Jeb HUNG Scribe, attest that this documentation has been prepared under the direction and in the presence of BEATRIS Hand on 02/04/2025 at 6:16 PM.  I, BEATRIS Hand, personally performed the services described in this documentation which was scribed virtually and I confirm that it is both accurate and complete.     BEATRIS Hand

## 2025-02-17 ENCOUNTER — APPOINTMENT (OUTPATIENT)
Dept: RADIOLOGY | Facility: HOSPITAL | Age: 45
End: 2025-02-17
Payer: COMMERCIAL

## 2025-02-25 DIAGNOSIS — I10 ESSENTIAL (PRIMARY) HYPERTENSION: ICD-10-CM

## 2025-02-25 RX ORDER — TRIAMTERENE/HYDROCHLOROTHIAZID 37.5-25 MG
1 TABLET ORAL DAILY
Qty: 90 TABLET | Refills: 0 | Status: SHIPPED | OUTPATIENT
Start: 2025-02-25

## 2025-03-17 ENCOUNTER — APPOINTMENT (OUTPATIENT)
Dept: PRIMARY CARE | Facility: CLINIC | Age: 45
End: 2025-03-17
Payer: COMMERCIAL

## 2025-03-17 VITALS
HEIGHT: 68 IN | HEART RATE: 95 BPM | WEIGHT: 293 LBS | OXYGEN SATURATION: 98 % | BODY MASS INDEX: 44.41 KG/M2 | TEMPERATURE: 97.7 F

## 2025-03-17 DIAGNOSIS — E66.01 MORBID OBESITY WITH BODY MASS INDEX (BMI) OF 45.0 TO 49.9 IN ADULT (MULTI): ICD-10-CM

## 2025-03-17 DIAGNOSIS — J45.40 MODERATE PERSISTENT ASTHMA WITHOUT COMPLICATION (HHS-HCC): ICD-10-CM

## 2025-03-17 DIAGNOSIS — Z12.31 BREAST CANCER SCREENING BY MAMMOGRAM: ICD-10-CM

## 2025-03-17 DIAGNOSIS — Z00.00 ROUTINE GENERAL MEDICAL EXAMINATION AT A HEALTH CARE FACILITY: Primary | ICD-10-CM

## 2025-03-17 DIAGNOSIS — I10 ESSENTIAL (PRIMARY) HYPERTENSION: ICD-10-CM

## 2025-03-17 PROCEDURE — 3008F BODY MASS INDEX DOCD: CPT | Performed by: FAMILY MEDICINE

## 2025-03-17 PROCEDURE — 1036F TOBACCO NON-USER: CPT | Performed by: FAMILY MEDICINE

## 2025-03-17 PROCEDURE — 99396 PREV VISIT EST AGE 40-64: CPT | Performed by: FAMILY MEDICINE

## 2025-03-17 RX ORDER — FLUTICASONE PROPIONATE AND SALMETEROL 250; 50 UG/1; UG/1
1 POWDER RESPIRATORY (INHALATION)
Qty: 60 EACH | Refills: 2 | Status: SHIPPED | OUTPATIENT
Start: 2025-03-17 | End: 2025-06-15

## 2025-03-17 RX ORDER — AMLODIPINE BESYLATE 5 MG/1
5 TABLET ORAL DAILY
Qty: 90 TABLET | Refills: 0 | Status: SHIPPED | OUTPATIENT
Start: 2025-03-17 | End: 2025-06-15

## 2025-03-17 ASSESSMENT — ENCOUNTER SYMPTOMS
OCCASIONAL FEELINGS OF UNSTEADINESS: 0
LOSS OF SENSATION IN FEET: 0
DEPRESSION: 0

## 2025-03-17 ASSESSMENT — PROMIS GLOBAL HEALTH SCALE
RATE_SOCIAL_SATISFACTION: GOOD
EMOTIONAL_PROBLEMS: RARELY
RATE_MENTAL_HEALTH: GOOD
RATE_AVERAGE_FATIGUE: MILD
RATE_PHYSICAL_HEALTH: GOOD
CARRYOUT_PHYSICAL_ACTIVITIES: MODERATELY
CARRYOUT_SOCIAL_ACTIVITIES: GOOD
RATE_AVERAGE_PAIN: 2
RATE_GENERAL_HEALTH: GOOD
RATE_QUALITY_OF_LIFE: GOOD

## 2025-03-17 NOTE — PATIENT INSTRUCTIONS
Immunizations recommended:  --Flu shot every fall. -done  --Shingrix is the shingles vaccine, and can be done after the age of 50.  --Tetanus every 10 years. Yours was done in 2018.  --Covid vaccine.    If your pap smears have been normal, you can do them every 3-5 years, and stop after the age of 65.  Yours was done 12/27/24 with gynecologist    Colonoscopy should be done every 10 years after the age of 45, unless there was a previous abnormality, or family history of colon cancer.  Alternatives would be Cologuard every 3 years (looks for genetic evidence of colon cancer in stool), or stool FIT stool test yearly (looks for microscopic blood in stool)  --cologuard done and normal in 2024.    Mammogram screening recommendations are changing, but at this time, I recommend a mammogram every 1-2 years in your 40's, and yearly after the age of 50.  Having a family history of breast cancer may change that.  --ordered    You should try to get 150 minutes of exercise weekly.  Be sure to eat a diet rich in fruits and vegetables, and low in saturated fats and sodium.  Strive for a healthy BMI of less than 25.     Make sure to wear sun screen when outside, and check for changing or unusual moles.    Pre-menopause recommendations are for 1000 mg calcium and 1000 units vitamin D3 daily.  After menopause calcium recommendation is 1200 mg, with 1000 units of vitamin D3    I recommend you get a Living Will and Durable Power of  for Healthcare. These documents state what care you would want if you couldn't speak for yourself. They help your loved ones in caring for you if that happens.   Once you have those forms completed, you can keep a copy on file with your doctor.    Discussed weight loss options.  Previously medications not covered.  Could try an independent weight loss clinic.  Can also consider bariatric clinic.    For asthma, will change to Advair 250, since Qvar not controlling symptoms well enough.  If continue  wheezing, or using albuterol more than 3 times  a week,  will adjust  medication.    BP is running higher than desired.  Continue triamterene hydrochlorothiazide   Add amlodipine 5 mg daily.  Follow up in 3 months.    Check fasting blood work some morning.  Fast for 12 hours prior to having blood drawn.  You should drink plenty of water, and can have black tea or black coffee, if you'd like.    For anxiety,  continue low dose sertraline 25 mg.

## 2025-03-17 NOTE — PROGRESS NOTES
"Subjective   Patient ID: Nico Pedro is a 45 y.o. female who presents for Weight Management and Annual Exam.  Well Adult Physical   Patient here for a comprehensive physical exam.     History:  LMP: No LMP recorded.  Last pap date: 24  Abnormal pap? .  : 2  Para: 2    Has been struggling with weight, and has been going up.  Has increased exercise.  Trying to cut out certain foods.    BP has been running higher     Having a lot of digestive issues.  Has been for about 1-2 years.  Now feels constipated or incomplete emptying.  Having bloating and gas.  No blood in stool.     Not sure Qvar is helping.  2-3 times a week has wheezing.      Review of Systems   All other systems reviewed and are negative.    Objective   Pulse 95   Temp 36.5 °C (97.7 °F) (Temporal)   Ht 1.727 m (5' 8\")   Wt 145 kg (320 lb 9.6 oz)   SpO2 98%   BMI 48.75 kg/m²     Physical Exam  Vitals reviewed.   Constitutional:       General: She is not in acute distress.     Appearance: Normal appearance.   HENT:      Head: Normocephalic and atraumatic.   Eyes:      Pupils: Pupils are equal, round, and reactive to light.   Cardiovascular:      Rate and Rhythm: Normal rate and regular rhythm.      Heart sounds: Normal heart sounds. No murmur heard.  Pulmonary:      Effort: Pulmonary effort is normal.      Breath sounds: Normal breath sounds.   Abdominal:      General: Bowel sounds are normal.      Palpations: Abdomen is soft. There is no mass.      Tenderness: There is no abdominal tenderness.   Musculoskeletal:      Cervical back: No rigidity.   Lymphadenopathy:      Cervical: No cervical adenopathy.   Skin:     General: Skin is warm and dry.   Neurological:      Mental Status: She is alert. Mental status is at baseline.      Gait: Gait normal.   Psychiatric:         Mood and Affect: Mood normal.         Behavior: Behavior normal.         Thought Content: Thought content normal.         Judgment: Judgment normal. "       Assessment/Plan   Problem List Items Addressed This Visit       Moderate persistent asthma without complication (HHS-HCC)

## 2025-03-18 DIAGNOSIS — E66.01 MORBID OBESITY WITH BODY MASS INDEX (BMI) OF 45.0 TO 49.9 IN ADULT (MULTI): Primary | ICD-10-CM

## 2025-03-18 RX ORDER — SEMAGLUTIDE 0.25 MG/.5ML
0.25 INJECTION, SOLUTION SUBCUTANEOUS WEEKLY
Qty: 2 ML | Refills: 0 | Status: SHIPPED | OUTPATIENT
Start: 2025-03-18 | End: 2025-04-09

## 2025-03-21 ENCOUNTER — APPOINTMENT (OUTPATIENT)
Dept: RADIOLOGY | Facility: CLINIC | Age: 45
End: 2025-03-21
Payer: COMMERCIAL

## 2025-03-25 ENCOUNTER — HOSPITAL ENCOUNTER (OUTPATIENT)
Dept: RADIOLOGY | Facility: HOSPITAL | Age: 45
Discharge: HOME | End: 2025-03-25
Payer: COMMERCIAL

## 2025-03-25 ENCOUNTER — DOCUMENTATION (OUTPATIENT)
Dept: PRIMARY CARE | Facility: CLINIC | Age: 45
End: 2025-03-25
Payer: COMMERCIAL

## 2025-03-25 DIAGNOSIS — N36.8 URETHRAL CYST: ICD-10-CM

## 2025-03-25 PROCEDURE — 72195 MRI PELVIS W/O DYE: CPT

## 2025-03-27 ENCOUNTER — APPOINTMENT (OUTPATIENT)
Dept: RADIOLOGY | Facility: CLINIC | Age: 45
End: 2025-03-27
Payer: COMMERCIAL

## 2025-03-27 NOTE — RESULT ENCOUNTER NOTE
Nico, I am sorry that the MRI was taxing for you, but enough was done for the surgeon to see how to excise the cyst.  I will have our schedulers reach out to you to see Dr. Trotter. If questions please call.

## 2025-03-30 LAB
ALBUMIN SERPL-MCNC: 4.2 G/DL (ref 3.6–5.1)
ALP SERPL-CCNC: 70 U/L (ref 31–125)
ALT SERPL-CCNC: 38 U/L (ref 6–29)
ANION GAP SERPL CALCULATED.4IONS-SCNC: 9 MMOL/L (CALC) (ref 7–17)
AST SERPL-CCNC: 26 U/L (ref 10–35)
BILIRUB SERPL-MCNC: 0.4 MG/DL (ref 0.2–1.2)
BUN SERPL-MCNC: 10 MG/DL (ref 7–25)
CALCIUM SERPL-MCNC: 9.3 MG/DL (ref 8.6–10.2)
CHLORIDE SERPL-SCNC: 105 MMOL/L (ref 98–110)
CHOLEST SERPL-MCNC: 171 MG/DL
CHOLEST/HDLC SERPL: 4 (CALC)
CO2 SERPL-SCNC: 24 MMOL/L (ref 20–32)
CREAT SERPL-MCNC: 0.61 MG/DL (ref 0.5–0.99)
EGFRCR SERPLBLD CKD-EPI 2021: 111 ML/MIN/1.73M2
ERYTHROCYTE [DISTWIDTH] IN BLOOD BY AUTOMATED COUNT: 16.2 % (ref 11–15)
EST. AVERAGE GLUCOSE BLD GHB EST-MCNC: 108 MG/DL
EST. AVERAGE GLUCOSE BLD GHB EST-SCNC: 6 MMOL/L
GLUCOSE SERPL-MCNC: 87 MG/DL (ref 65–99)
HBA1C MFR BLD: 5.4 % OF TOTAL HGB
HCT VFR BLD AUTO: 33.9 % (ref 35–45)
HDLC SERPL-MCNC: 43 MG/DL
HGB BLD-MCNC: 9.9 G/DL (ref 11.7–15.5)
LDLC SERPL CALC-MCNC: 113 MG/DL (CALC)
MCH RBC QN AUTO: 24.1 PG (ref 27–33)
MCHC RBC AUTO-ENTMCNC: 29.2 G/DL (ref 32–36)
MCV RBC AUTO: 82.5 FL (ref 80–100)
NONHDLC SERPL-MCNC: 128 MG/DL (CALC)
PLATELET # BLD AUTO: 333 THOUSAND/UL (ref 140–400)
PMV BLD REES-ECKER: 10.6 FL (ref 7.5–12.5)
POTASSIUM SERPL-SCNC: 4.2 MMOL/L (ref 3.5–5.3)
PROT SERPL-MCNC: 7.5 G/DL (ref 6.1–8.1)
RBC # BLD AUTO: 4.11 MILLION/UL (ref 3.8–5.1)
SODIUM SERPL-SCNC: 138 MMOL/L (ref 135–146)
TRIGL SERPL-MCNC: 67 MG/DL
TSH SERPL-ACNC: 3.33 MIU/L
WBC # BLD AUTO: 5.3 THOUSAND/UL (ref 3.8–10.8)

## 2025-03-31 DIAGNOSIS — D64.9 ANEMIA, UNSPECIFIED TYPE: ICD-10-CM

## 2025-03-31 DIAGNOSIS — D64.9 ANEMIA, UNSPECIFIED TYPE: Primary | ICD-10-CM

## 2025-05-14 ENCOUNTER — APPOINTMENT (OUTPATIENT)
Dept: OBSTETRICS AND GYNECOLOGY | Facility: CLINIC | Age: 45
End: 2025-05-14
Payer: COMMERCIAL

## 2025-05-18 DIAGNOSIS — I10 ESSENTIAL (PRIMARY) HYPERTENSION: ICD-10-CM

## 2025-05-18 RX ORDER — TRIAMTERENE AND HYDROCHLOROTHIAZIDE 37.5; 25 MG/1; MG/1
1 TABLET ORAL DAILY
Qty: 90 TABLET | Refills: 1 | Status: SHIPPED | OUTPATIENT
Start: 2025-05-18

## 2025-06-11 ENCOUNTER — APPOINTMENT (OUTPATIENT)
Dept: OBSTETRICS AND GYNECOLOGY | Facility: CLINIC | Age: 45
End: 2025-06-11
Payer: COMMERCIAL

## 2025-06-11 VITALS
DIASTOLIC BLOOD PRESSURE: 88 MMHG | WEIGHT: 293 LBS | BODY MASS INDEX: 44.41 KG/M2 | HEART RATE: 101 BPM | SYSTOLIC BLOOD PRESSURE: 159 MMHG | HEIGHT: 68 IN

## 2025-06-11 DIAGNOSIS — N95.1 PERIMENOPAUSE: ICD-10-CM

## 2025-06-11 DIAGNOSIS — Q52.4 GARTNER'S DUCT CYST: Primary | ICD-10-CM

## 2025-06-11 PROCEDURE — 99215 OFFICE O/P EST HI 40 MIN: CPT | Performed by: OBSTETRICS & GYNECOLOGY

## 2025-06-11 PROCEDURE — 3008F BODY MASS INDEX DOCD: CPT | Performed by: OBSTETRICS & GYNECOLOGY

## 2025-06-11 PROCEDURE — 3077F SYST BP >= 140 MM HG: CPT | Performed by: OBSTETRICS & GYNECOLOGY

## 2025-06-11 PROCEDURE — 3079F DIAST BP 80-89 MM HG: CPT | Performed by: OBSTETRICS & GYNECOLOGY

## 2025-06-11 ASSESSMENT — ENCOUNTER SYMPTOMS
DEPRESSION: 0
LOSS OF SENSATION IN FEET: 0
OCCASIONAL FEELINGS OF UNSTEADINESS: 0

## 2025-06-11 ASSESSMENT — PAIN SCALES - GENERAL: PAINLEVEL_OUTOF10: 0-NO PAIN

## 2025-06-11 NOTE — PROGRESS NOTES
"[facility] Department of Urogynecology   Kathrine Trotter MD, MPH   [phone]    ASSESSMENT AND PLAN:   45 y.o. female being assessed for alphonso's duct cyst.     Diagnoses:   #1 Alphonso's duct cyst  #2 Perimenopausal symptoms     Plan:   1. 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 We would not know definitively until time of surgery  - Treatment depends on her degree of bother. The cyst would return if it was only drained. She was counseled on total removal of the cyst in the OR. If she did have a urethral diverticulum, she would need a catheter x5 days postop.   - She plans to monitor her cyst for now. RTC if the cyst becomes more bothersome and she is considering surgery.   - Reassured patient her cyst is unrelated to perimenopause.     2. Perimenopausal symptoms  - Follow up with Krissy Hathaway NP for further management.      Follow-up in 1-2 months with Krissy Hathaway NP to discuss perimenopause.     Scribe Attestation:   I, Jeanne Acuña, am scribing for virtually, and in the presence of Kathrine Trotter MD MPH on 06/11/2025 at 1:34 PM.     Problem List Items Addressed This Visit    None  Visit Diagnoses         Alphonso's duct cyst    -  Primary      Perimenopause               I spent a total of 45 minutes in face to face and non face to face time.   I Dr. Trotter, personally performed the services described in the documentation as scribed in my presence and confirm it is both complete and accurate.  Kathrine Trotter MD, MPH, FACOG       Kathrine Trotter MD, MPH, FACOG     Established, pt of Krissy Hathaway NP    HISTORY OF PRESENT ILLNESS:     Nico Pedro is a 45 y.o. female who presents for evaluation of vaginal cyst.      Record Review:   - 3/25/25 MRI " pelvis: Evaluation is limited as the patient was unable to complete most of the exam. 4.3 cystic structure along the right lateral margin of the urethra and lateral margin of the vagina. Although incompletely evaluated, this appears to be external to the urethra and the elongated shape may favor cyst over diverticulum.   - 25 Krissy Hathaway note: Soft, reducible non-painful cyst ~ 2 cm in size, located along the backside of the urethra. Ordered MRI.     Vaginal Symptoms:  - She feels like the cyst is coming out of the vagina more and has gotten larger.  - The cyst is mainly noticed with wiping and cleaning. It is not noticeable during intercourse.      Urinary Symptoms:   - Endorses VASILE.   - She also has urinary urgency.   - Denies wearing pads for incontinence.   - Leakage occurs occasionally and she usually leaks a small amount of urine.   - Denies hx of UTIs.   - She's experienced times of having urgency, but was unable to have a complete void.   - Denies postvoid dribble.     Bowel Symptoms:   - Endorses constipation.   - She uses Dulcolax and another laxative (she can't remember the name).   - Denies FI.     OBGYN History and Sexual Activity:   - ,  x2  - Endorses a tear into the rectum with one delivery.   - She has lower abdominal pain, worse during her menses. Menses have been heavier than normal. Occasionally, she will skip a period.   - Denies dyspareunia.   - Denies hx of abnormal pap.        Past Medical History:     Medical History[1]       Past Surgical History:     Surgical History[2]      Medications:     Prior to Admission medications    Medication Sig Start Date End Date Taking? Authorizing Provider   albuterol 90 mcg/actuation inhaler Inhale 2 puffs every 4 hours if needed for wheezing. 24  Yes Charlotte Marcos MD   amLODIPine (Norvasc) 5 mg tablet Take 1 tablet (5 mg) by mouth once daily. 3/17/25 6/15/25 Yes Charlotte Marcos MD   ferrous sulfate ER (Slow Iron) 140 (45 Fe) MG ER  "tablet Take 1 tablet (45 mg) by mouth once daily. 3/31/25 7/29/25 Yes BEATRIS Fitzgerald   fluticasone propion-salmeteroL (Advair Diskus) 250-50 mcg/dose diskus inhaler Inhale 1 puff 2 times a day. Rinse mouth with water after use to reduce aftertaste and incidence of candidiasis. Do not swallow. 3/17/25 6/15/25 Yes Charlotte Marcos MD   norethindrone (Aygestin) 5 mg tablet Take 1 tablet (5 mg) by mouth once daily. 3/9/22  Yes Domingo Souza MD   triamterene-hydrochlorothiazid (Maxzide-25) 37.5-25 mg tablet Take 1 tablet by mouth once daily. 5/18/25  Yes Charlotte Marcos MD   beclomethasone dipropionate (Qvar) 80 mcg/actuation inhaler Inhale 2 Inhalations 2 times a day. Rinse mouth with water after use to reduce aftertaste and incidence of candidiasis. Do not swallow. 3/4/24 3/17/25  Charlotte Marcos MD   semaglutide, weight loss, (Wegovy) 0.25 mg/0.5 mL pen injector Inject 0.25 mg under the skin 1 (one) time per week for 4 doses. 3/18/25 4/9/25  Charlotte Marcos MD        ROS  Review of Systems       PHYSICAL EXAM:    /88 (Patient Position: Sitting)   Pulse 101   Ht 1.727 m (5' 8\")   Wt 146 kg (322 lb 3.2 oz)   BMI 48.99 kg/m²   No LMP recorded.    Declines chaperone for physical exam.      Well developed, well nourished, in no apparent distress.   Neurologic/Psychiatric:  Awake, Alert and Oriented times 3.  Affect normal.     GENITAL/URINARY:     External Genitalia:  The patient has normal appearing external genitalia, normal skenes and bartholins glands, and a normal hair distribution.     Urethral Meatus:  Size normal, Location normal, Lesions absent, Prolapse absent.    Urethra:  Fullness absent, Masses absent. Normal on palpation     Bladder:  Fullness absent, Masses absent, Tenderness absent.    Vagina:  General appearance normal, Estrogen effect normal, Discharge absent, Lesions absent. Thin walled vaginal cyst was noted, it seems to go up to the level of the bladder neck on palpation " "    Cervix: Normal, no discharge.   Uterus:  normal size and mobile  Adnexa:  no pain over B/L adnexa     Anus/Perineum:  Lesions absent and Masses absent defer exam  Normal Perineum    Physical Exam  Genitourinary:   POP-Q measurements were:      Aa: Ba: C:      gH: 5, pB: 6, TVL:     Ap: Bp: D:               Data and DIAGNOSTIC STUDIES REVIEWED   Imaging  No results found.    Cardiology, Vascular, and Other Imaging  No other imaging results found for the past 7 days     No results found for: \"URINECULTURE\", \"[employer]\"   Lab Results   Component Value Date    GLUCOSE 87 03/29/2025    CALCIUM 9.3 03/29/2025     03/29/2025    K 4.2 03/29/2025    CO2 24 03/29/2025     03/29/2025    BUN 10 03/29/2025    CREATININE 0.61 03/29/2025     Lab Results   Component Value Date    WBC 5.3 03/29/2025    HGB 9.9 (L) 03/29/2025    HCT 33.9 (L) 03/29/2025    MCV 82.5 03/29/2025     03/29/2025             [1]   Past Medical History:  Diagnosis Date    Anemia     Anxiety     Asthma     Eczema     Personal history of diseases of the skin and subcutaneous tissue     History of eczema   [2]   Past Surgical History:  Procedure Laterality Date    CHOLECYSTECTOMY      OTHER SURGICAL HISTORY  10/09/2020    Tubal ligation    OTHER SURGICAL HISTORY  10/09/2020    Cholecystectomy     "

## 2025-06-12 DIAGNOSIS — I10 ESSENTIAL (PRIMARY) HYPERTENSION: ICD-10-CM

## 2025-06-12 RX ORDER — AMLODIPINE BESYLATE 5 MG/1
5 TABLET ORAL DAILY
Qty: 90 TABLET | Refills: 0 | Status: SHIPPED | OUTPATIENT
Start: 2025-06-12 | End: 2025-09-10

## 2025-09-11 ENCOUNTER — APPOINTMENT (OUTPATIENT)
Dept: OBSTETRICS AND GYNECOLOGY | Facility: CLINIC | Age: 45
End: 2025-09-11
Payer: COMMERCIAL